# Patient Record
Sex: FEMALE
[De-identification: names, ages, dates, MRNs, and addresses within clinical notes are randomized per-mention and may not be internally consistent; named-entity substitution may affect disease eponyms.]

---

## 2018-06-13 ENCOUNTER — HOSPITAL ENCOUNTER (OUTPATIENT)
Dept: HOSPITAL 25 - OREAST | Age: 68
Discharge: HOME | End: 2018-06-13
Attending: SPECIALIST
Payer: MEDICARE

## 2018-06-13 VITALS — DIASTOLIC BLOOD PRESSURE: 46 MMHG | SYSTOLIC BLOOD PRESSURE: 119 MMHG

## 2018-06-13 DIAGNOSIS — Z79.84: ICD-10-CM

## 2018-06-13 DIAGNOSIS — M10.9: ICD-10-CM

## 2018-06-13 DIAGNOSIS — M19.90: ICD-10-CM

## 2018-06-13 DIAGNOSIS — D86.9: ICD-10-CM

## 2018-06-13 DIAGNOSIS — I10: ICD-10-CM

## 2018-06-13 DIAGNOSIS — E78.00: ICD-10-CM

## 2018-06-13 DIAGNOSIS — E11.9: ICD-10-CM

## 2018-06-13 DIAGNOSIS — R01.1: ICD-10-CM

## 2018-06-13 DIAGNOSIS — Q14.3: ICD-10-CM

## 2018-06-13 DIAGNOSIS — H25.811: Primary | ICD-10-CM

## 2018-06-13 DIAGNOSIS — H35.373: ICD-10-CM

## 2018-06-13 PROCEDURE — V2632 POST CHMBR INTRAOCULAR LENS: HCPCS

## 2018-06-13 NOTE — OP
DATE OF OPERATION:  06/13/2018.

 

YOB: 1950.

 

SURGEON:  Sonido Astorga M.D.

 

PREOPERATIVE DIAGNOSIS:  Cataract right eye.

 

POSTOPERATIVE DIAGNOSIS:  Cataract right eye.

 

OPERATIVE PROCEDURE:  Extracapsular cataract extraction with intraocular lens implant right eye.

 

PROCEDURE:  The patient was brought to the operating room after being given 1/2% Alcaine with epineph
rine drops in the preoperative area.  The eye was prepped and draped in the usual sterile fashion.  S
terile drape and eyelid speculum were placed.  Again, topical 1/2% Alcaine with epinephrine was given
.  A paracentesis incision was made at the 9 o'clock position with the No.75 blade.  Clear cornea inc
ision 2.2 x 2.2-mm was created at the 12 o'clock position starting at the anterior limbus using the 2
.2-mm keratome.  The anterior chamber was irrigated with 0.4 mL of 1% non-preservative intracameral l
idocaine and filled with DisCoVisc.  A capsulorrhexis was completed using the cystotome and the Utrat
a forceps. Hydrodissection was performed with balanced salt solution.  The lens nucleus was removed w
ith the Phacoemulsification handpiece without incident.  Cortex was removed with the irrigation-aspir
ation handpiece.  The capsular bag was re-inflated using DisCoVisc and an [id number] 23 implant was inser
yuki with the shooter.  The irrigation-aspiration handpiece was used to remove all residual DisCoVisc.
  The eye was refilled with balanced salt solution and the wound checked and found to be watertight. 
 Topical Maxitrol drops were given.

 

 [id number]/CPS #: [id number]

## 2018-06-27 ENCOUNTER — HOSPITAL ENCOUNTER (OUTPATIENT)
Dept: HOSPITAL 25 - OREAST | Age: 68
Discharge: HOME | End: 2018-06-27
Attending: SPECIALIST
Payer: MEDICARE

## 2018-06-27 VITALS — DIASTOLIC BLOOD PRESSURE: 51 MMHG | SYSTOLIC BLOOD PRESSURE: 112 MMHG

## 2018-06-27 DIAGNOSIS — E78.2: ICD-10-CM

## 2018-06-27 DIAGNOSIS — R73.01: ICD-10-CM

## 2018-06-27 DIAGNOSIS — Z79.84: ICD-10-CM

## 2018-06-27 DIAGNOSIS — H54.8: ICD-10-CM

## 2018-06-27 DIAGNOSIS — H35.373: ICD-10-CM

## 2018-06-27 DIAGNOSIS — E11.9: ICD-10-CM

## 2018-06-27 DIAGNOSIS — D86.0: ICD-10-CM

## 2018-06-27 DIAGNOSIS — Q14.3: ICD-10-CM

## 2018-06-27 DIAGNOSIS — E55.9: ICD-10-CM

## 2018-06-27 DIAGNOSIS — I10: ICD-10-CM

## 2018-06-27 DIAGNOSIS — H25.812: Primary | ICD-10-CM

## 2018-06-27 PROCEDURE — V2632 POST CHMBR INTRAOCULAR LENS: HCPCS

## 2018-06-27 NOTE — OP
DATE OF OPERATION:  06/27/2018.

 

YOB: 1950.

 

SURGEON:  Sonido Astorga M.D.

 

PREOPERATIVE DIAGNOSIS:  Cataract left eye.

 

POSTOPERATIVE DIAGNOSIS:  Cataract left eye.

 

OPERATIVE PROCEDURE:  Extracapsular cataract extraction with intraocular lens implant left eye.

 

PROCEDURE:  The patient was brought to the operating room after being given 1/2% Alcaine with epineph
rine drops in the preoperative area.  The eye was prepped and draped in the usual sterile fashion.  S
terile drape and eyelid speculum were placed.  Again, topical 1/2% Alcaine with epinephrine was given
.  A paracentesis incision was made at the 3 o'clock position with the No.75 blade.  Clear cornea inc
ision 2.2 x 2.2-mm was created at the 6 o'clock position starting at the anterior limbus using the 2.
2-mm keratome.  The anterior chamber was irrigated with 0.4 mL of 1% non-preservative intracameral li
docaine and filled with DisCoVisc.  A capsulorrhexis was completed using the cystotome and the Utrata
 forceps. Hydrodissection was performed with balanced salt solution.  The lens nucleus was removed wi
th the Phacoemulsification handpiece without incident.  Cortex was removed with the irrigation-aspira
tion handpiece.  The capsular bag was re-inflated using DisCoVisc and an SN60WF 22.5 implant was inse
rted with the shooter.  The irrigation-aspiration handpiece was used to remove all residual DisCoVisc
.  The eye was refilled with balanced salt solution and the wound checked and found to be watertight.
  Topical Maxitrol drops were given.

 

 [id number]/CPS #: [id number]

## 2019-04-29 ENCOUNTER — HOSPITAL ENCOUNTER (EMERGENCY)
Dept: HOSPITAL 25 - UCCORT | Age: 69
Discharge: HOME | End: 2019-04-29
Payer: MEDICARE

## 2019-04-29 VITALS — DIASTOLIC BLOOD PRESSURE: 55 MMHG | SYSTOLIC BLOOD PRESSURE: 135 MMHG

## 2019-04-29 DIAGNOSIS — M76.62: Primary | ICD-10-CM

## 2019-04-29 PROCEDURE — 99213 OFFICE O/P EST LOW 20 MIN: CPT

## 2019-04-29 PROCEDURE — G0463 HOSPITAL OUTPT CLINIC VISIT: HCPCS

## 2019-04-29 NOTE — UC
Lower Extremity/Ankle HPI





- HPI Summary


HPI Summary: 





Patient presents to urgent care with her spouse.  Patient's 68-year-old female 

with a history of gout and arthritis.  Patient states since Thursday she's had 

progressive pain in her left ankle.  Patient states this hurts with direct 

palpation with movement of her foot.  No paresthesias.  Patient's been taking 

Motrin and Tylenol with little relief.  Patient has also been doing Epsom salts 

soaks.  No trauma.  No knee pain.  No fevers or chills.  Patient is on 

maintenance medication, colchicine, for her gout.  Patient with history of 

arthritis in her feet but no problems with her heels.  Patient does have a 

podiatrist that she seen in [place].  Patient's medications reviewed this visit.





- History of Current Complaint


Chief Complaint: UCLowerExtremity


Stated Complaint: LEFT FOOT CONCERN


Time Seen by Provider: 19 08:54


Hx Obtained From: Patient


Hx Last Menstrual Period: 15yrs


Pain Intensity: 1





- Allergies/Home Medications


Allergies/Adverse Reactions: 


 Allergies











Allergy/AdvReac Type Severity Reaction Status Date / Time


 


No Known Allergies Allergy   Verified 19 09:05














PMH/Surg Hx/FS Hx/Imm Hx


Previously Healthy: Yes





- Surgical History


Surgical History: Yes


Surgery Procedure, Year, and Place: [place] [facility]  [place], , 

[facility]





- Family History


Known Family History: Positive: Non-Contributory





- Social History


Lives: With Family


Alcohol Use: None


Substance Use Type: None


Smoking Status (MU): Never Smoked Tobacco





Review of Systems


All Other Systems Reviewed And Are Negative: Yes


Constitutional: Positive: Negative


Skin: Positive: Negative


Eyes: Positive: Negative


Musculoskeletal: Positive: Other: - left heel pain





Physical Exam





- Summary


Physical Exam Summary: 





Vital Signs Reviewed: Yes


A+Ox3, no distress


Eyes: Conjunctiva Clear


ENT: Hearing grossly normal  


neck: supple


Respiratory: Positive: No respiratory distress, No accessory muscle use 


Cardiovascular: skin color reflect adequate perfusion


Musculoskeletal Exam: slight limp,  + SLE + flex/ext knee + posterior ankle 

with flex/ext + tenderness with flex/ext ankle posterior ankle + Point 

tenderness insertion of achilles tendon on left no defect + no pain along 

malleoli or tarsals


Neurological: Positive: Alert,  ambulatory without difficulty + gross sensation 

throughout


Psychological: Positive: Normal Response To Family


Skin: Positive: no rash, no ecchymosis, no erythema slight edema posterior ankle





Triage Information Reviewed: Yes


Vital Signs: 


 Initial Vital Signs











Temp  98.3 F   19 08:55


 


Pulse  66   19 08:55


 


Resp  18   19 08:55


 


BP  135/55   19 08:55


 


Pulse Ox  98   19 08:55














Diagnostics





- Radiology


  ** No standard instances


Radiology Interpretation Completed By: Radiologist -  Attending Doctor: Katie Velasquez ([id number]) : Bill Case ([id number]) 

: NUANCE (NUANCE) Report Date: 2019 09:29:00 Report Status: 

Final ======================= Begin of Report Content ======================  

Patient Name: PAUL JOYCE Medical Record#: [id number] Ordering Physician: 

Katie Velasquez MD Acct.#: [id number] : 1950 Age: 68 Sex: F Location: 

URGENT CARE [facility] Exam Date: 19 ADM Status: REG ER  Order 

Information: FOOT LEFT 3+ VWS Accession Number: [id number] CPT: 09780 

Indication: LEFT Achilles insertion region pain.  Comparison: No relevant prior 

exams available on the [facility] PACS for comparison.  Technique: AP, lateral, and 

oblique views LEFT foot.  Report: Joint space narrowing and subchondral 

sclerosis at the first metatarsal phalangeal joint. Periarticular chronic 

erosions at the head of the first metatarsal with overlying soft tissue 

swelling. Negative for fracture. Small Achilles tendon insertion and plantar 

fascia origin bone spurs. Diffuse soft tissue swelling including along the 

course of the Achilles tendon.  IMPRESSION: #. 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 

first metatarsal phalangeal joint. #. Soft tissue swelling including along the 

course of the Achilles tendon concerning for potential tendinopathy. Consider 

ultrasound or MRI for further assessment if deemed appropriate.    _____________

_______________________________________________ <Electronically signed by 

Bill Case MD in OV> 19 1004  Dictated By: Bill Case MD 

Dictated Date/Time: 19 1004 Transcribed Date/Time: 19 1001  Copy to

:     CC:Alayna Champion MD; Katie Velasquez MD  Imaging - [facility] - 

[facility] - [facility] [id number] [place] [address] [id number] [place] [phone] 

ph ([phone]) ph ([phone]) ph ([phone])  ======================== 

End of Report Content ======================





Re-Evaluation





- Re-Evaluation


  ** First Eval


Comment: reviewed imaging with pt.  pt with significant visual impairment - 

unable to navigate with crutches- has a cane.  Place cam boot - pt reports 

markedly improved.  refer to ortho/sports medicine.  pred.  strict return 

precaution





Lower Extremity Course/Dx





- Course


Course Of Treatment: 





Patient presents to urgent care with discomfort in her left ankle this been 

progressive for the last 5 days.  Patient states she has a history of arthritis 

and typically Motrin Tylenol helps.  Patient stated has not been helping with 

this.  No erythema no redness.  Mild edema.  No trauma.  Patient takes 

colchicine daily for gout.  Patient states this feels different and she's never 

had gout in her ankle.  Patient's medications reviewed this visit.  Vital signs 

are stable.  On exam patient with tenderness at the insertion of her left 

Achilles.  Mild edema.  No erythema no warmth.  Will check imaging.  Suspect it'

s tendinitis.  Likely due to a course of prednisone.  Will consider trying 

crutches or/and Cam Walker.  We'll review once images are back.





- Differential Dx/Diagnosis


Provider Diagnosis: 


 Achilles tendinitis








Discharge





- Sign-Out/Discharge


Documenting (check all that apply): Patient Departure


All imaging exams completed and their final reports reviewed: Yes





- Discharge Plan


Condition: Stable


Disposition: HOME


Prescriptions: 


predniSONE TAB* [Deltasone 20 MG TAB*] 20 mg PO DAILY #11 tab


Patient Education Materials:  Achilles Tendinitis (ED)


Referrals: 


Luís Goldsmith MD [Medical Doctor] - As Soon As Possible


(Call today to schedule an appointment


)


Alayna Champion MD [Primary Care Provider] - 


Additional Instructions: 


- Okay to alternate ibuprofen (Advil, Motrin) and tylenol every 3 hours for 

pain. Take with food


- Take prednisone daily as prescribed


- okay to apply ice (wrapped in a towel) 20 minutes at a time, 2-3 times a day


- It is very important you minimize walking- use the walking boot with a cane 

or crutches at all time


- contact Dr. Goldsmith - orthopedic provider -call today to schedule a recheck 

this week 








- Billing Disposition and Condition


Condition: STABLE


Disposition: Home